# Patient Record
Sex: FEMALE | Race: WHITE | NOT HISPANIC OR LATINO | URBAN - METROPOLITAN AREA
[De-identification: names, ages, dates, MRNs, and addresses within clinical notes are randomized per-mention and may not be internally consistent; named-entity substitution may affect disease eponyms.]

---

## 2024-01-28 ENCOUNTER — EMERGENCY (EMERGENCY)
Facility: HOSPITAL | Age: 29
LOS: 0 days | Discharge: HOME | End: 2024-01-28
Payer: MEDICAID

## 2024-01-28 VITALS
HEART RATE: 104 BPM | HEIGHT: 61 IN | DIASTOLIC BLOOD PRESSURE: 74 MMHG | OXYGEN SATURATION: 96 % | TEMPERATURE: 98 F | WEIGHT: 149.91 LBS | SYSTOLIC BLOOD PRESSURE: 120 MMHG | RESPIRATION RATE: 16 BRPM
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
## 2024-01-28 DIAGNOSIS — M79.89 OTHER SPECIFIED SOFT TISSUE DISORDERS: ICD-10-CM

## 2024-01-28 DIAGNOSIS — S62.634A DISPLACED FRACTURE OF DISTAL PHALANX OF RIGHT RING FINGER, INITIAL ENCOUNTER FOR CLOSED FRACTURE: ICD-10-CM

## 2024-01-28 PROCEDURE — 99284 EMERGENCY DEPT VISIT MOD MDM: CPT | Mod: 57

## 2024-01-28 PROCEDURE — 73130 X-RAY EXAM OF HAND: CPT | Mod: 26,RT

## 2024-01-28 PROCEDURE — 26750 TREAT FINGER FRACTURE EACH: CPT | Mod: 54,RT

## 2024-01-28 NOTE — ED PROVIDER NOTE - OBJECTIVE STATEMENT
29-year-old female without PMH presents with moderate constant throbbing non-radiating right fourth digit swelling/bruising times last night s/p mechanical trip and fall injuring R fourth digit.   +worse with palpation/movement.  no other injuries/paresthesia. no open wounds.   no head trauma/loc/n/v/blood thinner use.  denies pregnancy

## 2024-01-28 NOTE — ED ADULT NURSE NOTE - NSFALLRISKINTERV_ED_ALL_ED

## 2024-01-28 NOTE — ED PROCEDURE NOTE - NS ED PERI VASCULAR NEG
+ecchymosis/fingers/toes warm to touch/no paresthesia/no cyanosis of extremity/capillary refill time < 2 seconds

## 2024-01-28 NOTE — ED PROVIDER NOTE - CARE PROVIDER_API CALL
your pmd in 1-3 days,   Phone: (   )    -  Fax: (   )    -  Follow Up Time:     Omar Dawn  Plastic Surgery  [address] [place] [address], [facility] [address]  [place] [address]  Phone: [phone]  Fax: [phone]  Follow Up Time: 1-3 Days

## 2024-01-28 NOTE — ED PROVIDER NOTE - CLINICAL SUMMARY MEDICAL DECISION MAKING FREE TEXT BOX
+finger fracture  splinted.   normal sensation/motor function.   Reviewed all results and necessity for follow up. Counseled on red flags and to return for them.  Patient appears well on discharge.

## 2024-01-28 NOTE — ED ADULT NURSE NOTE - OBJECTIVE STATEMENT
Patient is a 28y/o A&Ox4 female complaining of finger pain/injury on the right ring finger. Patient reports having a fall and injuring her finger. As per patient, denies head and other injuries.

## 2024-01-28 NOTE — ED PROVIDER NOTE - PHYSICAL EXAMINATION
PHYSICAL EXAM:    GENERAL: Alert, appears stated age, well appearing, non-toxic  SKIN: Warm, and dry. MMM.   HEAD: NC, AT  EYE: Normal lids/conjunctiva  ENT: Normal hearing, patent oropharynx   Pulm: normal resp effort  CV: 2+and = radial pulses  Mskel: no erythema, cyanosis, edema. no calf tenderness. no ttp/decreased rom to LUE/LLE/RLE. no spinal ttp. no ttp/decreased rom to RUE except for R 4th digit ecchymosis/swelling and ttp to dip. no subungual hematoma. cap refill brisk.   Neuro: AAOx3, no sensory/motor deficits, 5/5 strength throughout. normal gait.

## 2024-01-28 NOTE — ED PROVIDER NOTE - PATIENT PORTAL LINK FT
You can access the FollowMyHealth Patient Portal offered by [facility] by registering at the following website: [email or web address]. By joining [facility]’s FollowMyHealth portal, you will also be able to view your health information using other applications (apps) compatible with our system.

## 2024-02-19 ENCOUNTER — OFFICE (OUTPATIENT)
Facility: LOCATION | Age: 29
Setting detail: OPHTHALMOLOGY
End: 2024-02-19

## 2024-02-19 DIAGNOSIS — H52.13: ICD-10-CM

## 2024-02-19 PROCEDURE — SCREF LASIK EVAL: Performed by: OPHTHALMOLOGY

## 2024-02-19 ASSESSMENT — SPHEQUIV_DERIVED
OD_SPHEQUIV: -3.875
OS_SPHEQUIV: -3.75

## 2024-02-19 ASSESSMENT — REFRACTION_AUTOREFRACTION
OD_CYLINDER: -0.25
OS_CYLINDER: -0.50
OS_AXIS: 070
OD_SPHERE: -3.75
OS_SPHERE: -3.50
OD_AXIS: 085

## 2024-02-19 ASSESSMENT — CONFRONTATIONAL VISUAL FIELD TEST (CVF)
OS_FINDINGS: FULL
OD_FINDINGS: FULL

## 2024-02-23 ENCOUNTER — OTHER LOCATION (OUTPATIENT)
Dept: URBAN - METROPOLITAN AREA LASIK CENTER 3 | Facility: LASIK CENTER | Age: 29
Setting detail: OPHTHALMOLOGY
End: 2024-02-23

## 2024-02-23 DIAGNOSIS — H52.13: ICD-10-CM

## 2024-02-23 PROCEDURE — 65760 KERATOMILEUSIS: CPT | Mod: RT,LT | Performed by: OPHTHALMOLOGY

## 2024-02-23 ASSESSMENT — REFRACTION_AUTOREFRACTION
OD_CYLINDER: -0.25
OS_CYLINDER: -0.50
OD_AXIS: 085
OS_SPHERE: -3.50
OD_SPHERE: -3.75
OS_AXIS: 070

## 2024-02-23 ASSESSMENT — SPHEQUIV_DERIVED
OS_SPHEQUIV: -3.75
OD_SPHEQUIV: -3.875

## 2025-04-15 NOTE — ED ADULT TRIAGE NOTE - SOURCE OF INFORMATION
Pre-Op Diagnosis: Degenerative Disc Disease, Lumbar Spinal Stenosis Right LumbarL4 Radiculitis.    Post-OP Diagnosis: The same    Procedure: Right L4/5 Transforaminal Epidural Steroid injection under fluroscopic guidance.      After obtaining informed consent and marking the side of the injection, the patient was taken to the procedure room and placed in the prone position on the operating table.  Next, using oblique fluoroscopic view neuroforaminae of L4/5 was identified.  Skin and subcutaneous tissues were anesthetized with 5 mL of 1% plain Lidocaine.  Strict aseptic technique was used throughout this procedure and Chlorhexidine was used for skin prep.  Next, under real-time fluoroscopy 20 guage spinal needle was placed just below 6 o\"clock position on the pedicle of vertebral body lumbar spinal  4.  A lateral x-ray image was obtained confirming appropriate needle position and depth. At this point, 1ml Isovue 200-M was injected whereby a contrast epidurogram was visualized in both PA and lateral views.  Next, 0.5 cc of 0.5% Marcaine mixed with 40 mg of Depomedrol was injected. There were no paresthesia. The patient was taken to the recovery area in satisfactory condition.  Fluoroscopy time:  29 sec  John Brown MD  4/15/2025         Patient